# Patient Record
Sex: MALE | Race: WHITE | NOT HISPANIC OR LATINO | URBAN - METROPOLITAN AREA
[De-identification: names, ages, dates, MRNs, and addresses within clinical notes are randomized per-mention and may not be internally consistent; named-entity substitution may affect disease eponyms.]

---

## 2023-12-01 ENCOUNTER — OFFICE VISIT (OUTPATIENT)
Dept: URBAN - METROPOLITAN AREA CLINIC 50 | Facility: CLINIC | Age: 63
End: 2023-12-01
Payer: COMMERCIAL

## 2023-12-01 ENCOUNTER — LAB OUTSIDE AN ENCOUNTER (OUTPATIENT)
Dept: URBAN - METROPOLITAN AREA CLINIC 50 | Facility: CLINIC | Age: 63
End: 2023-12-01

## 2023-12-01 VITALS
DIASTOLIC BLOOD PRESSURE: 90 MMHG | BODY MASS INDEX: 27.58 KG/M2 | HEIGHT: 68 IN | TEMPERATURE: 96.8 F | WEIGHT: 182 LBS | HEART RATE: 65 BPM | SYSTOLIC BLOOD PRESSURE: 132 MMHG

## 2023-12-01 DIAGNOSIS — R13.19 OTHER DYSPHAGIA: ICD-10-CM

## 2023-12-01 DIAGNOSIS — K22.2 SCHATZKI'S RING: ICD-10-CM

## 2023-12-01 DIAGNOSIS — K44.9 HIATAL HERNIA: ICD-10-CM

## 2023-12-01 DIAGNOSIS — D12.6 TUBULAR ADENOMA OF COLON: ICD-10-CM

## 2023-12-01 DIAGNOSIS — R14.2 BELCHING: ICD-10-CM

## 2023-12-01 DIAGNOSIS — K21.00 GASTROESOPHAGEAL REFLUX DISEASE WITH ESOPHAGITIS, UNSPECIFIED WHETHER HEMORRHAGE: ICD-10-CM

## 2023-12-01 PROBLEM — 84089009: Status: ACTIVE | Noted: 2023-12-01

## 2023-12-01 PROBLEM — 444898006: Status: ACTIVE | Noted: 2023-12-01

## 2023-12-01 PROBLEM — 271834000: Status: ACTIVE | Noted: 2023-12-01

## 2023-12-01 PROBLEM — 235623002: Status: ACTIVE | Noted: 2023-12-01

## 2023-12-01 PROBLEM — 266433003: Status: ACTIVE | Noted: 2023-12-01

## 2023-12-01 PROCEDURE — 99244 OFF/OP CNSLTJ NEW/EST MOD 40: CPT | Performed by: PHYSICIAN ASSISTANT

## 2023-12-01 RX ORDER — SODIUM, POTASSIUM,MAG SULFATES 17.5-3.13G
177ML SOLUTION, RECONSTITUTED, ORAL ORAL
Qty: 1 | Refills: 0 | OUTPATIENT
Start: 2023-12-01 | End: 2023-12-02

## 2023-12-01 RX ORDER — FAMOTIDINE 10 MG/1
1 TABLET AS NEEDED TABLET ORAL TWICE A DAY
Status: ACTIVE | COMMUNITY

## 2023-12-01 RX ORDER — PANTOPRAZOLE SODIUM 40 MG/1
1 TABLET TABLET, DELAYED RELEASE ORAL ONCE A DAY
Qty: 30 TABLET | Refills: 1 | OUTPATIENT
Start: 2023-12-01

## 2023-12-01 NOTE — HPI-TODAY'S VISIT:
Patient is 64 y/o male here to re-establish care of Dr. Yassine Phelps Here to discuss reflux concerns worsening Bleching more frequently, more robust, even with drinking water Sensitive to most things with eating/drinking causing belching Using 10mg famotidine twice daily which seems to help indigestion/heartburn but relatively sensitive Tries to avoid spicy/greasy/chocolate/fried foods/tomato which is helpful However now sometimes veggies, fruits can bother Some occasional epigastric discomfort as well No routine nauesa/vomiting Does admit mid-chest swallow discomfort with reflux trigger such as tomatos/red sauce Denies abnormal weight loss, up to date on labs w cardiology, unsure on anemia hx or if recently checked No bowel habit changes/constipation/diarrhea/melena, BMs daily No famHx colon polyps but personal Hx tubular adenoma, last C-scope 1/19, 5 yr repeat due Notes hx cardiac stents in 2021, now on ASA 81, no other blood thinners, can do stairs with no issues, no kidney/lung disease

## 2024-01-17 ENCOUNTER — OFFICE VISIT (OUTPATIENT)
Dept: URBAN - METROPOLITAN AREA SURGERY CENTER 18 | Facility: SURGERY CENTER | Age: 64
End: 2024-01-17
Payer: COMMERCIAL

## 2024-01-17 ENCOUNTER — WEB ENCOUNTER (OUTPATIENT)
Dept: URBAN - METROPOLITAN AREA CLINIC 96 | Facility: CLINIC | Age: 64
End: 2024-01-17

## 2024-01-17 DIAGNOSIS — K22.2 ESOPHAGEAL OBSTRUCTION: ICD-10-CM

## 2024-01-17 DIAGNOSIS — R13.19 OTHER DYSPHAGIA: ICD-10-CM

## 2024-01-17 DIAGNOSIS — Z86.010 PERSONAL HISTORY OF COLON POLYPS: ICD-10-CM

## 2024-01-17 DIAGNOSIS — Z09 ENCNTR FOR F/U EXAM AFT TRTMT FOR COND OTH THAN MALIG NEOPLM: ICD-10-CM

## 2024-01-17 DIAGNOSIS — K44.9 HIATAL HERNIA: ICD-10-CM

## 2024-01-17 DIAGNOSIS — K21.00 GASTRO-ESOPHAGEAL REFLUX DISEASE WITH ESOPHAGITIS, WITHOUT BLEEDING: ICD-10-CM

## 2024-01-17 DIAGNOSIS — K20.80 ESOPHAGITIS, LOS ANGELES GRADE A: ICD-10-CM

## 2024-01-17 DIAGNOSIS — Z86.010 PERSONAL HISTORY OF COLONIC POLYPS: ICD-10-CM

## 2024-01-17 DIAGNOSIS — Z09 ENCOUNTER FOR FOLLOW-UP EXAMINATION AFTER COMPLETED TREATMENT FOR CONDITIONS OTHER THAN MALIGNANT NEOPLASM: ICD-10-CM

## 2024-01-17 PROCEDURE — G8907 PT DOC NO EVENTS ON DISCHARG: HCPCS | Performed by: INTERNAL MEDICINE

## 2024-01-17 PROCEDURE — 00813 ANES UPR LWR GI NDSC PX: CPT | Performed by: NURSE ANESTHETIST, CERTIFIED REGISTERED

## 2024-01-17 PROCEDURE — G0105 COLORECTAL SCRN; HI RISK IND: HCPCS | Performed by: INTERNAL MEDICINE

## 2024-01-17 PROCEDURE — 43235 EGD DIAGNOSTIC BRUSH WASH: CPT | Performed by: INTERNAL MEDICINE

## 2024-01-17 RX ORDER — PANTOPRAZOLE SODIUM 40 MG/1
1 TABLET TABLET, DELAYED RELEASE ORAL ONCE A DAY
Qty: 90 TABLET | Refills: 1 | OUTPATIENT
Start: 2024-01-17

## 2024-01-17 RX ORDER — FAMOTIDINE 10 MG/1
1 TABLET AS NEEDED TABLET ORAL TWICE A DAY
Status: ACTIVE | COMMUNITY

## 2024-01-17 RX ORDER — PANTOPRAZOLE SODIUM 40 MG/1
1 TABLET TABLET, DELAYED RELEASE ORAL ONCE A DAY
Qty: 30 TABLET | Refills: 1 | Status: ACTIVE | COMMUNITY
Start: 2023-12-01

## 2024-01-26 ENCOUNTER — TELEPHONE ENCOUNTER (OUTPATIENT)
Dept: URBAN - METROPOLITAN AREA CLINIC 96 | Facility: CLINIC | Age: 64
End: 2024-01-26

## 2024-01-29 ENCOUNTER — TELEPHONE ENCOUNTER (OUTPATIENT)
Dept: URBAN - METROPOLITAN AREA CLINIC 96 | Facility: CLINIC | Age: 64
End: 2024-01-29

## 2024-01-29 RX ORDER — PANTOPRAZOLE SODIUM 40 MG/1
1 TABLET TABLET, DELAYED RELEASE ORAL ONCE A DAY
Qty: 90 TABLET | Refills: 1
Start: 2024-01-17

## 2024-05-01 ENCOUNTER — OFFICE VISIT (OUTPATIENT)
Dept: URBAN - METROPOLITAN AREA CLINIC 50 | Facility: CLINIC | Age: 64
End: 2024-05-01
Payer: COMMERCIAL

## 2024-05-01 ENCOUNTER — DASHBOARD ENCOUNTERS (OUTPATIENT)
Age: 64
End: 2024-05-01

## 2024-05-01 VITALS
HEIGHT: 68 IN | DIASTOLIC BLOOD PRESSURE: 77 MMHG | SYSTOLIC BLOOD PRESSURE: 125 MMHG | TEMPERATURE: 97.8 F | HEART RATE: 61 BPM | WEIGHT: 181.4 LBS | BODY MASS INDEX: 27.49 KG/M2

## 2024-05-01 DIAGNOSIS — K21.00 GASTROESOPHAGEAL REFLUX DISEASE WITH ESOPHAGITIS, UNSPECIFIED WHETHER HEMORRHAGE: ICD-10-CM

## 2024-05-01 DIAGNOSIS — K44.9 HIATAL HERNIA: ICD-10-CM

## 2024-05-01 DIAGNOSIS — Z86.010 PERSONAL HISTORY OF COLONIC POLYPS: ICD-10-CM

## 2024-05-01 PROBLEM — 428283002: Status: ACTIVE | Noted: 2024-05-01

## 2024-05-01 PROCEDURE — 99214 OFFICE O/P EST MOD 30 MIN: CPT | Performed by: PHYSICIAN ASSISTANT

## 2024-05-01 RX ORDER — PANTOPRAZOLE SODIUM 40 MG/1
1 TABLET TABLET, DELAYED RELEASE ORAL ONCE A DAY
Qty: 30 TABLET | Refills: 1 | Status: ACTIVE | COMMUNITY

## 2024-05-01 RX ORDER — PANTOPRAZOLE SODIUM 20 MG/1
1 TABLET TABLET, DELAYED RELEASE ORAL ONCE A DAY
Qty: 90 TABLET | Refills: 3 | OUTPATIENT
Start: 2024-05-01

## 2024-05-01 RX ORDER — PANTOPRAZOLE SODIUM 40 MG/1
1 TABLET TABLET, DELAYED RELEASE ORAL ONCE A DAY
Qty: 90 TABLET | Refills: 1 | Status: DISCONTINUED | COMMUNITY

## 2024-05-01 RX ORDER — FAMOTIDINE 10 MG/1
1 TABLET AS NEEDED TABLET ORAL TWICE A DAY
Status: DISCONTINUED | COMMUNITY

## 2024-08-27 ENCOUNTER — TELEPHONE ENCOUNTER (OUTPATIENT)
Dept: URBAN - METROPOLITAN AREA CLINIC 50 | Facility: CLINIC | Age: 64
End: 2024-08-27

## 2024-08-29 ENCOUNTER — TELEPHONE ENCOUNTER (OUTPATIENT)
Dept: URBAN - METROPOLITAN AREA CLINIC 96 | Facility: CLINIC | Age: 64
End: 2024-08-29

## 2024-10-07 ENCOUNTER — TELEPHONE ENCOUNTER (OUTPATIENT)
Dept: URBAN - METROPOLITAN AREA CLINIC 50 | Facility: CLINIC | Age: 64
End: 2024-10-07

## 2024-10-07 RX ORDER — PANTOPRAZOLE SODIUM 20 MG/1
1 TABLET TABLET, DELAYED RELEASE ORAL TWICE DAILY
Qty: 180 | Refills: 2 | OUTPATIENT
Start: 2024-10-08

## 2025-05-01 ENCOUNTER — OFFICE VISIT (OUTPATIENT)
Dept: URBAN - METROPOLITAN AREA CLINIC 50 | Facility: CLINIC | Age: 65
End: 2025-05-01

## 2025-05-05 ENCOUNTER — OFFICE VISIT (OUTPATIENT)
Dept: URBAN - METROPOLITAN AREA CLINIC 50 | Facility: CLINIC | Age: 65
End: 2025-05-05
Payer: COMMERCIAL

## 2025-05-05 DIAGNOSIS — Z86.0101 PERSONAL HISTORY OF ADENOMATOUS AND SERRATED COLON POLYPS: ICD-10-CM

## 2025-05-05 DIAGNOSIS — K21.00 GASTROESOPHAGEAL REFLUX DISEASE WITH ESOPHAGITIS, UNSPECIFIED WHETHER HEMORRHAGE: ICD-10-CM

## 2025-05-05 DIAGNOSIS — Z09 EXAMINATION OF, FOLLOW-UP: ICD-10-CM

## 2025-05-05 DIAGNOSIS — K44.9 HIATAL HERNIA: ICD-10-CM

## 2025-05-05 PROCEDURE — 99213 OFFICE O/P EST LOW 20 MIN: CPT | Performed by: PHYSICIAN ASSISTANT

## 2025-05-05 RX ORDER — PANTOPRAZOLE SODIUM 20 MG/1
1 TABLET TABLET, DELAYED RELEASE ORAL ONCE A DAY
Qty: 90 TABLET | Refills: 3 | OUTPATIENT
Start: 2025-05-05

## 2025-05-05 RX ORDER — PANTOPRAZOLE SODIUM 20 MG/1
1 TABLET TABLET, DELAYED RELEASE ORAL ONCE A DAY
Qty: 90 TABLET | Refills: 3 | Status: ACTIVE | COMMUNITY

## 2025-05-05 RX ORDER — PANTOPRAZOLE SODIUM 40 MG/1
1 TABLET TABLET, DELAYED RELEASE ORAL ONCE A DAY
Qty: 30 TABLET | Refills: 1 | Status: ON HOLD | COMMUNITY

## 2025-05-05 NOTE — HPI-TODAY'S VISIT:
5/5/25: 63 y/o f/u GERD Doing great - dose decrease to 20mg last year, seems to work well 90%, no issue w this Staying away from extra coffee, trigger foods helps avoid issues Rarely will use an evening pantoprazole 20 if eating bad No nausea/vomiting/abd pains  Bowels normal, no blood/mucus/melena Last labs w PCP fall 2024  States liver enzymes were hi but resolved on a recheck about a month later Also did an US -  reviewed per [facility] w report revealing neg exam -- 5/1/24: 64 y/o M here for f/u reflux Completed EGD/surveillance colon in 1/24 Since then, doing better Avoiding certain foods, watching diet more which seems to help Using pantoprazole 40mg, believes has made a big difference Worried on long term risks/benefits of this Almost never indigestion, heartburn, no dysphagia Greasy/fatty food, tomato based can very rarely cause issues, but specifically identifies triggers No weight loss, appetite good No abd pains, bowel habits good/unchanged -- 12/1/23: Patient is 64 y/o male here to re-establish care of Dr. Yassine Phelps Here to discuss reflux concerns worsening Bleching more frequently, more robust, even with drinking water Sensitive to most things with eating/drinking causing belching Using 10mg famotidine twice daily which seems to help indigestion/heartburn but relatively sensitive Tries to avoid spicy/greasy/chocolate/fried foods/tomato which is helpful However now sometimes veggies, fruits can bother Some occasional epigastric discomfort as well No routine nauesa/vomiting Does admit mid-chest swallow discomfort with reflux trigger such as tomatos/red sauce Denies abnormal weight loss, up to date on labs w cardiology, unsure on anemia hx or if recently checked No bowel habit changes/constipation/diarrhea/melena, BMs daily No famHx colon polyps but personal Hx tubular adenoma, last C-scope 1/19, 5 yr repeat due Notes hx cardiac stents in 2021, now on ASA 81, no other blood thinners, can do stairs with no issues, no kidney/lung disease